# Patient Record
Sex: MALE | Race: BLACK OR AFRICAN AMERICAN | NOT HISPANIC OR LATINO | URBAN - METROPOLITAN AREA
[De-identification: names, ages, dates, MRNs, and addresses within clinical notes are randomized per-mention and may not be internally consistent; named-entity substitution may affect disease eponyms.]

---

## 2018-08-08 ENCOUNTER — INPATIENT (INPATIENT)
Facility: HOSPITAL | Age: 43
LOS: 1 days | Discharge: HOME | End: 2018-08-10
Attending: PSYCHIATRY & NEUROLOGY | Admitting: PSYCHIATRY & NEUROLOGY
Payer: COMMERCIAL

## 2018-08-08 VITALS
RESPIRATION RATE: 16 BRPM | HEART RATE: 86 BPM | DIASTOLIC BLOOD PRESSURE: 92 MMHG | SYSTOLIC BLOOD PRESSURE: 130 MMHG | OXYGEN SATURATION: 97 % | TEMPERATURE: 98 F

## 2018-08-08 DIAGNOSIS — F32.2 MAJOR DEPRESSIVE DISORDER, SINGLE EPISODE, SEVERE WITHOUT PSYCHOTIC FEATURES: ICD-10-CM

## 2018-08-08 DIAGNOSIS — F33.9 MAJOR DEPRESSIVE DISORDER, RECURRENT, UNSPECIFIED: ICD-10-CM

## 2018-08-08 LAB
ALBUMIN SERPL ELPH-MCNC: 4.4 G/DL — SIGNIFICANT CHANGE UP (ref 3.3–5)
ALP SERPL-CCNC: 73 U/L — SIGNIFICANT CHANGE UP (ref 40–120)
ALT FLD-CCNC: 24 U/L — SIGNIFICANT CHANGE UP (ref 4–41)
AMPHET UR-MCNC: NEGATIVE — SIGNIFICANT CHANGE UP
APAP SERPL-MCNC: < 15 UG/ML — LOW (ref 15–25)
APPEARANCE UR: CLEAR — SIGNIFICANT CHANGE UP
AST SERPL-CCNC: 33 U/L — SIGNIFICANT CHANGE UP (ref 4–40)
BARBITURATES UR SCN-MCNC: NEGATIVE — SIGNIFICANT CHANGE UP
BASOPHILS # BLD AUTO: 0.02 K/UL — SIGNIFICANT CHANGE UP (ref 0–0.2)
BASOPHILS NFR BLD AUTO: 0.4 % — SIGNIFICANT CHANGE UP (ref 0–2)
BENZODIAZ UR-MCNC: NEGATIVE — SIGNIFICANT CHANGE UP
BILIRUB SERPL-MCNC: 0.4 MG/DL — SIGNIFICANT CHANGE UP (ref 0.2–1.2)
BILIRUB UR-MCNC: NEGATIVE — SIGNIFICANT CHANGE UP
BLOOD UR QL VISUAL: NEGATIVE — SIGNIFICANT CHANGE UP
BUN SERPL-MCNC: 9 MG/DL — SIGNIFICANT CHANGE UP (ref 7–23)
CALCIUM SERPL-MCNC: 9.5 MG/DL — SIGNIFICANT CHANGE UP (ref 8.4–10.5)
CANNABINOIDS UR-MCNC: NEGATIVE — SIGNIFICANT CHANGE UP
CHLORIDE SERPL-SCNC: 103 MMOL/L — SIGNIFICANT CHANGE UP (ref 98–107)
CO2 SERPL-SCNC: 23 MMOL/L — SIGNIFICANT CHANGE UP (ref 22–31)
COCAINE METAB.OTHER UR-MCNC: NEGATIVE — SIGNIFICANT CHANGE UP
COLOR SPEC: SIGNIFICANT CHANGE UP
CREAT SERPL-MCNC: 1.18 MG/DL — SIGNIFICANT CHANGE UP (ref 0.5–1.3)
EOSINOPHIL # BLD AUTO: 0.03 K/UL — SIGNIFICANT CHANGE UP (ref 0–0.5)
EOSINOPHIL NFR BLD AUTO: 0.6 % — SIGNIFICANT CHANGE UP (ref 0–6)
ETHANOL BLD-MCNC: < 10 MG/DL — SIGNIFICANT CHANGE UP
GLUCOSE SERPL-MCNC: 98 MG/DL — SIGNIFICANT CHANGE UP (ref 70–99)
GLUCOSE UR-MCNC: NEGATIVE — SIGNIFICANT CHANGE UP
HCT VFR BLD CALC: 44 % — SIGNIFICANT CHANGE UP (ref 39–50)
HGB BLD-MCNC: 14.1 G/DL — SIGNIFICANT CHANGE UP (ref 13–17)
IMM GRANULOCYTES # BLD AUTO: 0.01 # — SIGNIFICANT CHANGE UP
IMM GRANULOCYTES NFR BLD AUTO: 0.2 % — SIGNIFICANT CHANGE UP (ref 0–1.5)
KETONES UR-MCNC: NEGATIVE — SIGNIFICANT CHANGE UP
LEUKOCYTE ESTERASE UR-ACNC: NEGATIVE — SIGNIFICANT CHANGE UP
LYMPHOCYTES # BLD AUTO: 0.89 K/UL — LOW (ref 1–3.3)
LYMPHOCYTES # BLD AUTO: 18.8 % — SIGNIFICANT CHANGE UP (ref 13–44)
MCHC RBC-ENTMCNC: 27.1 PG — SIGNIFICANT CHANGE UP (ref 27–34)
MCHC RBC-ENTMCNC: 32 % — SIGNIFICANT CHANGE UP (ref 32–36)
MCV RBC AUTO: 84.6 FL — SIGNIFICANT CHANGE UP (ref 80–100)
METHADONE UR-MCNC: NEGATIVE — SIGNIFICANT CHANGE UP
MONOCYTES # BLD AUTO: 0.37 K/UL — SIGNIFICANT CHANGE UP (ref 0–0.9)
MONOCYTES NFR BLD AUTO: 7.8 % — SIGNIFICANT CHANGE UP (ref 2–14)
MUCOUS THREADS # UR AUTO: SIGNIFICANT CHANGE UP
NEUTROPHILS # BLD AUTO: 3.41 K/UL — SIGNIFICANT CHANGE UP (ref 1.8–7.4)
NEUTROPHILS NFR BLD AUTO: 72.2 % — SIGNIFICANT CHANGE UP (ref 43–77)
NITRITE UR-MCNC: NEGATIVE — SIGNIFICANT CHANGE UP
NON-SQ EPI CELLS # UR AUTO: <1 — SIGNIFICANT CHANGE UP
NRBC # FLD: 0 — SIGNIFICANT CHANGE UP
OPIATES UR-MCNC: NEGATIVE — SIGNIFICANT CHANGE UP
OXYCODONE UR-MCNC: NEGATIVE — SIGNIFICANT CHANGE UP
PCP UR-MCNC: NEGATIVE — SIGNIFICANT CHANGE UP
PH UR: 6.5 — SIGNIFICANT CHANGE UP (ref 4.6–8)
PLATELET # BLD AUTO: 224 K/UL — SIGNIFICANT CHANGE UP (ref 150–400)
PMV BLD: 10.1 FL — SIGNIFICANT CHANGE UP (ref 7–13)
POTASSIUM SERPL-MCNC: 3.8 MMOL/L — SIGNIFICANT CHANGE UP (ref 3.5–5.3)
POTASSIUM SERPL-SCNC: 3.8 MMOL/L — SIGNIFICANT CHANGE UP (ref 3.5–5.3)
PROT SERPL-MCNC: 7.9 G/DL — SIGNIFICANT CHANGE UP (ref 6–8.3)
PROT UR-MCNC: NEGATIVE MG/DL — SIGNIFICANT CHANGE UP
RBC # BLD: 5.2 M/UL — SIGNIFICANT CHANGE UP (ref 4.2–5.8)
RBC # FLD: 14.2 % — SIGNIFICANT CHANGE UP (ref 10.3–14.5)
RBC CASTS # UR COMP ASSIST: SIGNIFICANT CHANGE UP (ref 0–?)
SALICYLATES SERPL-MCNC: < 5 MG/DL — LOW (ref 15–30)
SODIUM SERPL-SCNC: 138 MMOL/L — SIGNIFICANT CHANGE UP (ref 135–145)
SP GR SPEC: 1.01 — SIGNIFICANT CHANGE UP (ref 1–1.04)
SQUAMOUS # UR AUTO: SIGNIFICANT CHANGE UP
TSH SERPL-MCNC: 1.06 UIU/ML — SIGNIFICANT CHANGE UP (ref 0.27–4.2)
UROBILINOGEN FLD QL: NORMAL MG/DL — SIGNIFICANT CHANGE UP
WBC # BLD: 4.73 K/UL — SIGNIFICANT CHANGE UP (ref 3.8–10.5)
WBC # FLD AUTO: 4.73 K/UL — SIGNIFICANT CHANGE UP (ref 3.8–10.5)
WBC UR QL: SIGNIFICANT CHANGE UP (ref 0–?)

## 2018-08-08 PROCEDURE — 99285 EMERGENCY DEPT VISIT HI MDM: CPT | Mod: GC

## 2018-08-08 RX ORDER — HYDROXYZINE HCL 10 MG
50 TABLET ORAL EVERY 6 HOURS
Qty: 0 | Refills: 0 | Status: DISCONTINUED | OUTPATIENT
Start: 2018-08-08 | End: 2018-08-08

## 2018-08-08 RX ORDER — IBUPROFEN 200 MG
400 TABLET ORAL EVERY 8 HOURS
Qty: 0 | Refills: 0 | Status: DISCONTINUED | OUTPATIENT
Start: 2018-08-08 | End: 2018-08-08

## 2018-08-08 RX ORDER — LANOLIN ALCOHOL/MO/W.PET/CERES
3 CREAM (GRAM) TOPICAL AT BEDTIME
Qty: 0 | Refills: 0 | Status: DISCONTINUED | OUTPATIENT
Start: 2018-08-08 | End: 2018-08-08

## 2018-08-08 RX ORDER — IBUPROFEN 200 MG
400 TABLET ORAL ONCE
Qty: 0 | Refills: 0 | Status: COMPLETED | OUTPATIENT
Start: 2018-08-08 | End: 2018-08-08

## 2018-08-08 RX ORDER — HYDROXYZINE HCL 10 MG
50 TABLET ORAL ONCE
Qty: 0 | Refills: 0 | Status: DISCONTINUED | OUTPATIENT
Start: 2018-08-08 | End: 2018-08-10

## 2018-08-08 RX ADMIN — Medication 400 MILLIGRAM(S): at 16:15

## 2018-08-08 NOTE — ED BEHAVIORAL HEALTH ASSESSMENT NOTE - HPI (INCLUDE ILLNESS QUALITY, SEVERITY, DURATION, TIMING, CONTEXT, MODIFYING FACTORS, ASSOCIATED SIGNS AND SYMPTOMS)
Mr. Lorenzo is a 42 y/o man with no PPHx, PMHx of migraines, domiciled with wife, employed, no legal history/substance use history who was BIB by EMS after calling 911 and expressing SI.    On interview, Mr. Lorenzo presents with a blunted affect and poor eye contact. He is constantly preoccupied with stressors related to his relationship with his wife and requires frequent redirection to remain on topic. He states that he is not sure who called police to bring him to the hospital and does not elaborate on why police might have brought him to the hospital. He states that he was walking on the street crying when a stranger noticed him and asked if he needed help. He declined needing help and called his uncle, who told Mr. Lorenzo not to get the police involved. The pt is unsure who called police, but he was subsequently brought to the hospital for reported SI. The pt is vague about his suicidal ideation, frequently referencing thoughts of passive SI that life is not worth living and he would rather be dead than go back home to his wife. When asked about a plan to commit suicide, he was vague, stating that "anything could happen," but also later stating that he would kill himself if he had to go back to live with his wife. He initially refused to answer questions about active SI, instead speaking about the stressors with his wife, but later admitted to thinking about hanging himself, noting it would be easy to do. He denied access to firearms, but did not answer other questions regarding suicidality, instead talking about multiple stressors related to his wife. The pt endorses an exacerbation of his depressed feelings within the past two weeks due continued stress. He endorses sleep disturbances, anhedonia ("nothing in life is pleasurable"), poor appetite, hopelessness and passive/active SI. He denies HI, AH and VH.    Collateral obtained from pt's uncle Chucky ([phone]) - his uncle reports that the pt has a problem with his wife, but he does not want to get involved because he lives in [place] and the pt lives in [place]. He does not frequently talk to his nephew, but noted getting a call from him yesterday. The pt spoke about problems with his wife and the uncle indicated that he needs to address the issues with his family. He is unaware of any of the pt's current feelings of depression or suicidality. He denies that the pt has a history of psychiatric illness or substance use.    Please see BH note for collateral obtained from patient's spouse.

## 2018-08-08 NOTE — ED BEHAVIORAL HEALTH ASSESSMENT NOTE - SUMMARY
Mr. Lorenzo is a 42 y/o man with no PPHx, PMHx of migraines, domiciled with wife, employed, no legal history/substance use history who was BIB by EMS after calling 911 and expressing SI.    The pt is actively depressed with multiple depressive symptoms evident on examination, including passive/active SI. He is unable to engage in safety planning, stating that he would kill himself if he had to go home and that it would be "easy" to hang himself. He is preoccupied with his interpersonal difficulties and has yet to engage in treatment, but is actively help-seeking. The pt was unable to understand the differences between voluntary and involuntary hospitalization and was admitted under 9.39 status, but is agreeable to treatment and contracted for safety in the hospital. Standing antidepressant will be deferred to primary team.

## 2018-08-08 NOTE — ED BEHAVIORAL HEALTH ASSESSMENT NOTE - PSYCHIATRIC ISSUES AND PLAN (INCLUDE STANDING AND PRN MEDICATION)
will defer standing antidepressant, pt is unable to discuss risks/benefits of medication at this time. Atarax 50 mg q6h prn for anxiety, ativan 1 mg PO/IM q6h prn for agitation, melatonin 3 mg qHS prn for insomnia

## 2018-08-08 NOTE — ED BEHAVIORAL HEALTH ASSESSMENT NOTE - DETAILS
headache Handoff provided to Dr. Aly Pt spouse Kristina informed of transfer to [facility], number to low 3 provided. Main ED attending informed of discharge plan via phone see HPI

## 2018-08-08 NOTE — ED BEHAVIORAL HEALTH ASSESSMENT NOTE - CASE SUMMARY
Mr. Lorenzo is a 42 y/o man with no PPHx, PMHx of migraines, domiciled with wife, employed, no legal history/substance use history who was BIB by EMS after calling 911 and expressing SI. On exam pt is depressed, unable to give an organized account of events, reports ongoing SI ,unable to safety plan. He poses an imminent danger to self or others, declines voluntary admission will be admitted 9.39

## 2018-08-08 NOTE — ED BEHAVIORAL HEALTH ASSESSMENT NOTE - DESCRIPTION
ICU Vital Signs Last 24 Hrs  T(C): 36.8 (08 Aug 2018 11:38), Max: 36.8 (08 Aug 2018 11:38)  T(F): 98.3 (08 Aug 2018 11:38), Max: 98.3 (08 Aug 2018 11:38)  HR: 86 (08 Aug 2018 11:38) (86 - 86)  BP: 130/92 (08 Aug 2018 11:38) (130/92 - 130/92)  RR: 16 (08 Aug 2018 11:38) (16 - 16)  SpO2: 97% (08 Aug 2018 11:38) (97% - 97%)                        14.1   4.73  )-----------( 224      ( 08 Aug 2018 12:36 )             44.0   08-08    138  |  103  |  9   ----------------------------<  98  3.8   |  23  |  1.18    Ca    9.5      08 Aug 2018 12:36    TPro  7.9  /  Alb  4.4  /  TBili  0.4  /  DBili  x   /  AST  33  /  ALT  24  /  AlkPhos  73  08-08    Pt sleeping comfortably in bed, no acute distress headaches moved from [place] to pursue education,

## 2018-08-08 NOTE — ED ADULT NURSE NOTE - NSIMPLEMENTINTERV_GEN_ALL_ED
Implemented All Universal Safety Interventions:  [place] to call system. Call bell, personal items and telephone within reach. Instruct patient to call for assistance. Room bathroom lighting operational. Non-slip footwear when patient is off stretcher. Physically safe environment: no spills, clutter or unnecessary equipment. Stretcher in lowest position, wheels locked, appropriate side rails in place.

## 2018-08-08 NOTE — ED BEHAVIORAL HEALTH ASSESSMENT NOTE - RISK ASSESSMENT
Pt with active/passive SI and recent psychosocial stressors. He is not currently engaged in treatment, but is actively help-seeking. He expressed a desire to kill himself if he had to go home but could not identify another place to live. He is hopeless and cannot identify things he is looking forward to in the future. His affect is blunted, he is anhedonic and is currently suffering from an active mood episode. He is unable to contract for safety and is in need of inpatient treatment at this time.

## 2018-08-08 NOTE — ED PROVIDER NOTE - OBJECTIVE STATEMENT
42 y/o male with PMhx of Depression presents to ED for increased depressed thought and SI X wks. Pt states over the past 2 months having increased depressed thoughts which pt states is related to increased stress at home. Pt states over the past few wks having SI. Pt denies any headache, fever, chills, nausea, vomiting, SOB, chest pain, or abd pain. Pt states a number of years ago also having SI.

## 2018-08-08 NOTE — ED ADULT TRIAGE NOTE - CHIEF COMPLAINT QUOTE
Arrives ambulatory via EMS for suicidal statements.  Pt endorses stress.  When speaking to pt, he begins to cry.  FIT evaluated.  Pt to BH

## 2018-08-08 NOTE — ED PROVIDER NOTE - CARE PLAN
Principal Discharge DX:	Current severe episode of major depressive disorder without psychotic features, unspecified whether recurrent  Secondary Diagnosis:	Suicidal ideations

## 2018-08-08 NOTE — ED BEHAVIORAL HEALTH NOTE - BEHAVIORAL HEALTH NOTE
Worker spoke to patient’s cousin Shanti Wellington ([phone]) for collateral information regarding the patient. All information is as per Ms. Wellington:    Ms. Wellington states that she spoke to the patient a couple of days ago and he was complaining that he needs to leave his wife. Ms. Wellington states that she lives in [place] and the patient resides in [place]. Ms. Wellington provided spouse’s number for additional collateral.     Worker spoke to Kristina Lorenzo ([phone]) for collateral information. All information is as per Mrs. Lorenzo:  Mrs. Lorenzo states that she spoke to the patient today and he stated to her that he was doing well. Mrs. Lorenzo states she came home from work and saw the patient this morning and she did not see any concerning issues with the patient. Mrs. Lorenzo states that the patient is not having HI/SI/AH/VH and would never hurt himself and hurt anyone else. Mrs. Lorenzo states that she had an argument with the patient a couple days ago because she asked the patient about locking his phone with a password. Mrs. Lorenzo states that the patient was upset because he did it for privacy. Mrs. Lorenzo states that when they do argue the patient always bring up his green card that the wife gave him. Mrs. Lorenzo states that the patient has no children with her and they have been  since 2016. Mrs. Lorenzo states that the patient has never been physically aggressive with her but when they argue he always talks about the green card. Mrs. Lorenzo states that he left it on the table sometime last week and when she asked about why he left the green card on the table he told her because he wanted to give it to her. Mrs. Lorenzo states that the patient has issues with cholesterol but she is not sure if he has any other medical issues. Mrs. Lorenzo states that the patient threatened to leave the house but he was there this morning. Mrs. Lorenzo states she did not notice any changes with the patient but she does know when they argue he gets loud in his tone of voice.

## 2018-08-08 NOTE — ED ADULT NURSE NOTE - OBJECTIVE STATEMENT
Received pt in  pt c/o depression & Si tearful emotional support provided. pt oriented to unit eval on going.

## 2018-08-09 PROCEDURE — 99232 SBSQ HOSP IP/OBS MODERATE 35: CPT | Mod: 25

## 2018-08-09 PROCEDURE — 90853 GROUP PSYCHOTHERAPY: CPT

## 2018-08-09 RX ORDER — IBUPROFEN 200 MG
400 TABLET ORAL ONCE
Qty: 0 | Refills: 0 | Status: COMPLETED | OUTPATIENT
Start: 2018-08-09 | End: 2018-08-09

## 2018-08-09 RX ORDER — ESCITALOPRAM OXALATE 10 MG/1
5 TABLET, FILM COATED ORAL DAILY
Qty: 0 | Refills: 0 | Status: DISCONTINUED | OUTPATIENT
Start: 2018-08-09 | End: 2018-08-10

## 2018-08-09 RX ADMIN — Medication 400 MILLIGRAM(S): at 21:08

## 2018-08-09 RX ADMIN — Medication 400 MILLIGRAM(S): at 22:08

## 2018-08-10 VITALS — HEART RATE: 75 BPM | SYSTOLIC BLOOD PRESSURE: 112 MMHG | DIASTOLIC BLOOD PRESSURE: 75 MMHG | TEMPERATURE: 98 F

## 2018-08-10 PROCEDURE — 99238 HOSP IP/OBS DSCHRG MGMT 30/<: CPT

## 2018-08-10 PROCEDURE — 99221 1ST HOSP IP/OBS SF/LOW 40: CPT | Mod: GC

## 2018-08-10 RX ORDER — ESCITALOPRAM OXALATE 10 MG/1
1 TABLET, FILM COATED ORAL
Qty: 21 | Refills: 0 | OUTPATIENT
Start: 2018-08-10 | End: 2018-08-30

## 2018-08-10 RX ADMIN — ESCITALOPRAM OXALATE 5 MILLIGRAM(S): 10 TABLET, FILM COATED ORAL at 09:14

## 2018-10-22 ENCOUNTER — OUTPATIENT (OUTPATIENT)
Dept: OUTPATIENT SERVICES | Facility: HOSPITAL | Age: 43
LOS: 1 days | Discharge: STILL ADMITTED OR ADMINISTRATIVE | End: 2018-10-22

## 2018-10-23 DIAGNOSIS — F32.9 MAJOR DEPRESSIVE DISORDER, SINGLE EPISODE, UNSPECIFIED: ICD-10-CM

## 2019-06-17 NOTE — ED BEHAVIORAL HEALTH ASSESSMENT NOTE - MUSCLE TONE / STRENGTH
Follow up in 1 year.  Work on diet and exercise as discussed.    Labs through PCP.  Call with new or worsening symptoms.        Normal muscle tone/strength